# Patient Record
Sex: FEMALE | Race: WHITE | Employment: FULL TIME | URBAN - METROPOLITAN AREA
[De-identification: names, ages, dates, MRNs, and addresses within clinical notes are randomized per-mention and may not be internally consistent; named-entity substitution may affect disease eponyms.]

---

## 2017-04-04 ENCOUNTER — OFFICE VISIT (OUTPATIENT)
Dept: FAMILY MEDICINE CLINIC | Facility: CLINIC | Age: 27
End: 2017-04-04

## 2017-04-04 VITALS
HEIGHT: 67 IN | OXYGEN SATURATION: 99 % | DIASTOLIC BLOOD PRESSURE: 74 MMHG | BODY MASS INDEX: 22.76 KG/M2 | RESPIRATION RATE: 20 BRPM | SYSTOLIC BLOOD PRESSURE: 118 MMHG | HEART RATE: 74 BPM | WEIGHT: 145 LBS | TEMPERATURE: 98 F

## 2017-04-04 DIAGNOSIS — L08.9 LOCAL SKIN INFECTION: Primary | ICD-10-CM

## 2017-04-04 PROCEDURE — 99213 OFFICE O/P EST LOW 20 MIN: CPT | Performed by: NURSE PRACTITIONER

## 2017-04-04 RX ORDER — CEPHALEXIN 500 MG/1
500 CAPSULE ORAL 2 TIMES DAILY
Qty: 20 CAPSULE | Refills: 0 | Status: SHIPPED | OUTPATIENT
Start: 2017-04-04 | End: 2017-04-14

## 2017-04-06 NOTE — PROGRESS NOTES
CHIEF COMPLAINT:   Patient presents with:  Ear Problem      HPI:     Susan Yun is a 27year old female who presents with concerns of possible skin infection. Patient first noticed symptoms a few days ago she thought she had a pimple in her left ear. in Patient Instructions. Medication as below. Signed Prescriptions Disp Refills    cephALEXin 500 MG Oral Cap 20 capsule 0      Sig: Take 1 capsule (500 mg total) by mouth 2 (two) times daily.            There are no Patient Instructions on file for t

## 2017-06-23 PROBLEM — R51.9 LEFT FACIAL PRESSURE AND PAIN: Status: ACTIVE | Noted: 2017-06-23

## 2017-06-23 PROBLEM — H11.9 CONJUNCTIVA DISORDER: Status: ACTIVE | Noted: 2017-06-23

## 2017-07-17 ENCOUNTER — HOSPITAL ENCOUNTER (EMERGENCY)
Facility: HOSPITAL | Age: 27
Discharge: HOME | End: 2017-07-17
Attending: EMERGENCY MEDICINE
Payer: COMMERCIAL

## 2017-07-17 VITALS
DIASTOLIC BLOOD PRESSURE: 86 MMHG | HEIGHT: 67 IN | SYSTOLIC BLOOD PRESSURE: 118 MMHG | HEART RATE: 66 BPM | OXYGEN SATURATION: 100 % | BODY MASS INDEX: 22.76 KG/M2 | WEIGHT: 145 LBS | TEMPERATURE: 98 F | RESPIRATION RATE: 18 BRPM

## 2017-07-17 DIAGNOSIS — H53.142: Primary | ICD-10-CM

## 2017-07-17 DIAGNOSIS — S05.02XA CORNEAL ABRASION, LEFT, INITIAL ENCOUNTER: ICD-10-CM

## 2017-07-17 PROCEDURE — 99283 EMERGENCY DEPT VISIT LOW MDM: CPT

## 2017-07-17 RX ORDER — OFLOXACIN 3 MG/ML
2 SOLUTION/ DROPS OPHTHALMIC 4 TIMES DAILY
Qty: 5 ML | Refills: 0 | Status: SHIPPED | OUTPATIENT
Start: 2017-07-17

## 2017-07-17 RX ORDER — TETRACAINE HYDROCHLORIDE 5 MG/ML
2 SOLUTION OPHTHALMIC ONCE
Status: COMPLETED | OUTPATIENT
Start: 2017-07-17 | End: 2017-07-17

## 2017-07-18 NOTE — ED PROVIDER NOTES
I reviewed that chart and discussed the case. I have examined the patient and noted noted to have light sensitivity, slight pain left eye. .  No other specific complaint pupils equal reactive to muscles intact there is no foreign body.   Possible early corn

## 2017-07-18 NOTE — ED PROVIDER NOTES
Patient Seen in: [facility] Emergency Department    History   Patient presents with: Eye Visual Problem (opthalmic)    Stated Complaint: light sensitivity to left eye, pain to left eye    HPI    Patient is a 39-year-old female.   Patient arrives for Device: None (Room air)    Current:/86   Pulse 66   Temp 98 °F (36.7 °C) (Oral)   Resp 18   Ht 170.2 cm (5' 7\")   Wt 65.8 kg   LMP 07/03/2017 (Exact Date)   SpO2 100%   BMI 22.71 kg/m²     Right Eye Chart Acuity: 20/30, Corrected  Left Eye Chart Acu evidence of ulceration. No pooling. Patient does have concurrent nasal congestion. Possibly a viral etiology. We will initiate Ocuflox. If no significant improvement within 2-3 days, please follow-up with ophthalmology.   Disposition and Plan     Clini

## 2017-07-18 NOTE — ED INITIAL ASSESSMENT (HPI)
Pt c/o light sensitive to left eye since Friday with some nausea. Pt noticed a stronger glare around lights but otherwise no changes in vision. Pt states eye also hurts when moving. No discharge, slight tearing. Last vision exam 2 months ago.

## (undated) NOTE — ED AVS SNAPSHOT
[facility] Emergency Department  [address] [place] [address]  Phone:  [phone]  Fax:  [phone]          Ashlee Ryder   MRN: [id number]    Department:  [facility] Emergency Department   Date of Visit:  7/17/2017 IF THERE IS ANY CHANGE OR WORSENING OF YOUR CONDITION, CALL YOUR PRIMARY CARE PHYSICIAN AT ONCE OR RETURN IMMEDIATELY TO THE EMERGENCY DEPARTMENT.     If you have been prescribed any medication(s), please fill your prescription right away and begin taking t

## (undated) NOTE — MR AVS SNAPSHOT
[facility] [id number] [facility]  [address] [facility]  [place] [address]  [phone]               Thank you for choosing us for your health care visit with Guero Kaye NP. We are glad to serve you and happy to provide you with this summary of your visit.   Pl Enter your [employer] Activation Code exactly as it appears below along with your Zip Code and Date of Birth to complete the sign-up process. If you do not sign up before the expiration date, you must request a new code.     Your unique [employer] Access Code: [email or web address]